# Patient Record
Sex: FEMALE | Race: WHITE | NOT HISPANIC OR LATINO | URBAN - METROPOLITAN AREA
[De-identification: names, ages, dates, MRNs, and addresses within clinical notes are randomized per-mention and may not be internally consistent; named-entity substitution may affect disease eponyms.]

---

## 2017-05-29 ENCOUNTER — EMERGENCY (EMERGENCY)
Facility: HOSPITAL | Age: 82
LOS: 0 days | Discharge: HOME | End: 2017-05-29
Attending: EMERGENCY MEDICINE
Payer: MEDICARE

## 2017-05-29 VITALS
HEART RATE: 59 BPM | WEIGHT: 149.91 LBS | TEMPERATURE: 99 F | DIASTOLIC BLOOD PRESSURE: 69 MMHG | HEIGHT: 63 IN | OXYGEN SATURATION: 95 % | RESPIRATION RATE: 17 BRPM | SYSTOLIC BLOOD PRESSURE: 127 MMHG

## 2017-05-29 LAB
ALBUMIN SERPL ELPH-MCNC: 3 G/DL — LOW (ref 3.3–5)
ALP SERPL-CCNC: 62 U/L — SIGNIFICANT CHANGE UP (ref 40–120)
ALT FLD-CCNC: 13 U/L — SIGNIFICANT CHANGE UP (ref 12–78)
AMYLASE P1 CFR SERPL: 47 U/L — SIGNIFICANT CHANGE UP (ref 25–115)
ANION GAP SERPL CALC-SCNC: 11 MMOL/L — SIGNIFICANT CHANGE UP (ref 5–17)
APPEARANCE UR: CLEAR — SIGNIFICANT CHANGE UP
APTT BLD: 30.1 SEC — SIGNIFICANT CHANGE UP (ref 27.5–37.4)
AST SERPL-CCNC: 17 U/L — SIGNIFICANT CHANGE UP (ref 15–37)
BACTERIA # UR AUTO: ABNORMAL
BASOPHILS # BLD AUTO: 0.1 K/UL — SIGNIFICANT CHANGE UP (ref 0–0.2)
BASOPHILS NFR BLD AUTO: 1.6 % — SIGNIFICANT CHANGE UP (ref 0–2)
BILIRUB SERPL-MCNC: 0.8 MG/DL — SIGNIFICANT CHANGE UP (ref 0.2–1.2)
BILIRUB UR-MCNC: NEGATIVE — SIGNIFICANT CHANGE UP
BUN SERPL-MCNC: 24 MG/DL — HIGH (ref 7–23)
CALCIUM SERPL-MCNC: 8.6 MG/DL — SIGNIFICANT CHANGE UP (ref 8.5–10.1)
CHLORIDE SERPL-SCNC: 104 MMOL/L — SIGNIFICANT CHANGE UP (ref 96–108)
CO2 SERPL-SCNC: 25 MMOL/L — SIGNIFICANT CHANGE UP (ref 22–31)
COLOR SPEC: YELLOW — SIGNIFICANT CHANGE UP
CREAT SERPL-MCNC: 1.02 MG/DL — SIGNIFICANT CHANGE UP (ref 0.5–1.3)
DIFF PNL FLD: ABNORMAL
EOSINOPHIL # BLD AUTO: 0.1 K/UL — SIGNIFICANT CHANGE UP (ref 0–0.5)
EOSINOPHIL NFR BLD AUTO: 1.1 % — SIGNIFICANT CHANGE UP (ref 0–6)
EPI CELLS # UR: SIGNIFICANT CHANGE UP
GLUCOSE SERPL-MCNC: 97 MG/DL — SIGNIFICANT CHANGE UP (ref 70–99)
GLUCOSE UR QL: NEGATIVE MG/DL — SIGNIFICANT CHANGE UP
HCT VFR BLD CALC: 36.6 % — SIGNIFICANT CHANGE UP (ref 34.5–45)
HGB BLD-MCNC: 12.7 G/DL — SIGNIFICANT CHANGE UP (ref 11.5–15.5)
INR BLD: 1.11 RATIO — SIGNIFICANT CHANGE UP (ref 0.88–1.16)
KETONES UR-MCNC: NEGATIVE — SIGNIFICANT CHANGE UP
LACTATE SERPL-SCNC: 0.8 MMOL/L — SIGNIFICANT CHANGE UP (ref 0.7–2)
LEUKOCYTE ESTERASE UR-ACNC: NEGATIVE — SIGNIFICANT CHANGE UP
LIDOCAIN IGE QN: 153 U/L — SIGNIFICANT CHANGE UP (ref 73–393)
LYMPHOCYTES # BLD AUTO: 1.4 K/UL — SIGNIFICANT CHANGE UP (ref 1–3.3)
LYMPHOCYTES # BLD AUTO: 18.5 % — SIGNIFICANT CHANGE UP (ref 13–44)
MCHC RBC-ENTMCNC: 28.9 PG — SIGNIFICANT CHANGE UP (ref 27–34)
MCHC RBC-ENTMCNC: 34.7 GM/DL — SIGNIFICANT CHANGE UP (ref 32–36)
MCV RBC AUTO: 83.2 FL — SIGNIFICANT CHANGE UP (ref 80–100)
MONOCYTES # BLD AUTO: 0.6 K/UL — SIGNIFICANT CHANGE UP (ref 0–0.9)
MONOCYTES NFR BLD AUTO: 7.5 % — SIGNIFICANT CHANGE UP (ref 2–14)
NEUTROPHILS # BLD AUTO: 5.5 K/UL — SIGNIFICANT CHANGE UP (ref 1.8–7.4)
NEUTROPHILS NFR BLD AUTO: 71.2 % — SIGNIFICANT CHANGE UP (ref 43–77)
NITRITE UR-MCNC: NEGATIVE — SIGNIFICANT CHANGE UP
PH UR: 5 — SIGNIFICANT CHANGE UP (ref 5–8)
PLATELET # BLD AUTO: 224 K/UL — SIGNIFICANT CHANGE UP (ref 150–400)
POTASSIUM SERPL-MCNC: 3.9 MMOL/L — SIGNIFICANT CHANGE UP (ref 3.5–5.3)
POTASSIUM SERPL-SCNC: 3.9 MMOL/L — SIGNIFICANT CHANGE UP (ref 3.5–5.3)
PROT SERPL-MCNC: 7.3 GM/DL — SIGNIFICANT CHANGE UP (ref 6–8.3)
PROT UR-MCNC: NEGATIVE MG/DL — SIGNIFICANT CHANGE UP
PROTHROM AB SERPL-ACNC: 12.1 SEC — SIGNIFICANT CHANGE UP (ref 9.8–12.7)
RBC # BLD: 4.4 M/UL — SIGNIFICANT CHANGE UP (ref 3.8–5.2)
RBC # FLD: 12.3 % — SIGNIFICANT CHANGE UP (ref 11–15)
RBC CASTS # UR COMP ASSIST: SIGNIFICANT CHANGE UP /HPF (ref 0–4)
SODIUM SERPL-SCNC: 140 MMOL/L — SIGNIFICANT CHANGE UP (ref 135–145)
SP GR SPEC: 1 — LOW (ref 1.01–1.02)
UROBILINOGEN FLD QL: NEGATIVE MG/DL — SIGNIFICANT CHANGE UP
WBC # BLD: 7.7 K/UL — SIGNIFICANT CHANGE UP (ref 3.8–10.5)
WBC # FLD AUTO: 7.7 K/UL — SIGNIFICANT CHANGE UP (ref 3.8–10.5)
WBC UR QL: SIGNIFICANT CHANGE UP

## 2017-05-29 PROCEDURE — 99284 EMERGENCY DEPT VISIT MOD MDM: CPT

## 2017-05-29 PROCEDURE — 71010: CPT | Mod: 26

## 2017-05-29 RX ORDER — SODIUM CHLORIDE 9 MG/ML
500 INJECTION INTRAMUSCULAR; INTRAVENOUS; SUBCUTANEOUS ONCE
Qty: 0 | Refills: 0 | Status: COMPLETED | OUTPATIENT
Start: 2017-05-29 | End: 2017-05-29

## 2017-05-29 RX ORDER — NITROFURANTOIN MACROCRYSTAL 50 MG
1 CAPSULE ORAL
Qty: 14 | Refills: 0 | OUTPATIENT
Start: 2017-05-29 | End: 2017-06-05

## 2017-05-29 RX ADMIN — SODIUM CHLORIDE 1000 MILLILITER(S): 9 INJECTION INTRAMUSCULAR; INTRAVENOUS; SUBCUTANEOUS at 17:06

## 2017-05-29 NOTE — ED PROVIDER NOTE - MEDICAL DECISION MAKING DETAILS
Patient with uti symptoms; afebrile, patient's daughter will follow up with dr. sosa in am; will start macrobid

## 2017-05-30 DIAGNOSIS — Z88.0 ALLERGY STATUS TO PENICILLIN: ICD-10-CM

## 2017-05-30 DIAGNOSIS — Z79.82 LONG TERM (CURRENT) USE OF ASPIRIN: ICD-10-CM

## 2017-05-30 DIAGNOSIS — N39.0 URINARY TRACT INFECTION, SITE NOT SPECIFIED: ICD-10-CM

## 2017-05-30 DIAGNOSIS — M54.9 DORSALGIA, UNSPECIFIED: ICD-10-CM

## 2017-05-30 DIAGNOSIS — R10.9 UNSPECIFIED ABDOMINAL PAIN: ICD-10-CM

## 2017-05-30 LAB
CULTURE RESULTS: NO GROWTH — SIGNIFICANT CHANGE UP
SPECIMEN SOURCE: SIGNIFICANT CHANGE UP

## 2017-06-03 LAB
CULTURE RESULTS: SIGNIFICANT CHANGE UP
CULTURE RESULTS: SIGNIFICANT CHANGE UP
SPECIMEN SOURCE: SIGNIFICANT CHANGE UP
SPECIMEN SOURCE: SIGNIFICANT CHANGE UP

## 2017-11-09 ENCOUNTER — EMERGENCY (EMERGENCY)
Facility: HOSPITAL | Age: 82
LOS: 0 days | Discharge: HOME | End: 2017-11-09
Attending: EMERGENCY MEDICINE
Payer: MEDICARE

## 2017-11-09 VITALS
TEMPERATURE: 98 F | DIASTOLIC BLOOD PRESSURE: 78 MMHG | OXYGEN SATURATION: 98 % | HEART RATE: 65 BPM | RESPIRATION RATE: 18 BRPM | SYSTOLIC BLOOD PRESSURE: 185 MMHG

## 2017-11-09 VITALS
TEMPERATURE: 98 F | HEIGHT: 59 IN | SYSTOLIC BLOOD PRESSURE: 163 MMHG | DIASTOLIC BLOOD PRESSURE: 83 MMHG | OXYGEN SATURATION: 96 % | WEIGHT: 110.23 LBS | HEART RATE: 66 BPM | RESPIRATION RATE: 19 BRPM

## 2017-11-09 LAB
ALBUMIN SERPL ELPH-MCNC: 2.8 G/DL — LOW (ref 3.3–5)
ALP SERPL-CCNC: 46 U/L — SIGNIFICANT CHANGE UP (ref 40–120)
ALT FLD-CCNC: 12 U/L — SIGNIFICANT CHANGE UP (ref 12–78)
AMMONIA BLD-MCNC: <10 UMOL/L — LOW (ref 11–32)
ANION GAP SERPL CALC-SCNC: 10 MMOL/L — SIGNIFICANT CHANGE UP (ref 5–17)
APPEARANCE UR: ABNORMAL
AST SERPL-CCNC: 12 U/L — LOW (ref 15–37)
BILIRUB SERPL-MCNC: 0.7 MG/DL — SIGNIFICANT CHANGE UP (ref 0.2–1.2)
BILIRUB UR-MCNC: NEGATIVE — SIGNIFICANT CHANGE UP
BUN SERPL-MCNC: 12 MG/DL — SIGNIFICANT CHANGE UP (ref 7–23)
CALCIUM SERPL-MCNC: 8.2 MG/DL — LOW (ref 8.5–10.1)
CHLORIDE SERPL-SCNC: 106 MMOL/L — SIGNIFICANT CHANGE UP (ref 96–108)
CK SERPL-CCNC: 33 U/L — SIGNIFICANT CHANGE UP (ref 26–192)
CO2 SERPL-SCNC: 24 MMOL/L — SIGNIFICANT CHANGE UP (ref 22–31)
COLOR SPEC: YELLOW — SIGNIFICANT CHANGE UP
CREAT SERPL-MCNC: 0.82 MG/DL — SIGNIFICANT CHANGE UP (ref 0.5–1.3)
DIFF PNL FLD: ABNORMAL
GLUCOSE SERPL-MCNC: 82 MG/DL — SIGNIFICANT CHANGE UP (ref 70–99)
GLUCOSE UR QL: NEGATIVE MG/DL — SIGNIFICANT CHANGE UP
HCT VFR BLD CALC: 36.3 % — SIGNIFICANT CHANGE UP (ref 34.5–45)
HGB BLD-MCNC: 11.6 G/DL — SIGNIFICANT CHANGE UP (ref 11.5–15.5)
KETONES UR-MCNC: NEGATIVE — SIGNIFICANT CHANGE UP
LACTATE SERPL-SCNC: 1.5 MMOL/L — SIGNIFICANT CHANGE UP (ref 0.7–2)
LEUKOCYTE ESTERASE UR-ACNC: NEGATIVE — SIGNIFICANT CHANGE UP
LIDOCAIN IGE QN: 276 U/L — SIGNIFICANT CHANGE UP (ref 73–393)
MCHC RBC-ENTMCNC: 26.6 PG — LOW (ref 27–34)
MCHC RBC-ENTMCNC: 32 GM/DL — SIGNIFICANT CHANGE UP (ref 32–36)
MCV RBC AUTO: 83.1 FL — SIGNIFICANT CHANGE UP (ref 80–100)
NITRITE UR-MCNC: NEGATIVE — SIGNIFICANT CHANGE UP
PH UR: 5 — SIGNIFICANT CHANGE UP (ref 5–8)
PLATELET # BLD AUTO: 237 K/UL — SIGNIFICANT CHANGE UP (ref 150–400)
POTASSIUM SERPL-MCNC: 3.5 MMOL/L — SIGNIFICANT CHANGE UP (ref 3.5–5.3)
POTASSIUM SERPL-SCNC: 3.5 MMOL/L — SIGNIFICANT CHANGE UP (ref 3.5–5.3)
PROT SERPL-MCNC: 7.1 GM/DL — SIGNIFICANT CHANGE UP (ref 6–8.3)
PROT UR-MCNC: 15 MG/DL
RBC # BLD: 4.37 M/UL — SIGNIFICANT CHANGE UP (ref 3.8–5.2)
RBC # FLD: 14.6 % — SIGNIFICANT CHANGE UP (ref 11–15)
SODIUM SERPL-SCNC: 140 MMOL/L — SIGNIFICANT CHANGE UP (ref 135–145)
SP GR SPEC: 1.02 — SIGNIFICANT CHANGE UP (ref 1.01–1.02)
TROPONIN I SERPL-MCNC: 0.02 NG/ML — SIGNIFICANT CHANGE UP (ref 0.01–0.04)
UROBILINOGEN FLD QL: NEGATIVE MG/DL — SIGNIFICANT CHANGE UP
WBC # BLD: 5.3 K/UL — SIGNIFICANT CHANGE UP (ref 3.8–10.5)
WBC # FLD AUTO: 5.3 K/UL — SIGNIFICANT CHANGE UP (ref 3.8–10.5)

## 2017-11-09 PROCEDURE — 99284 EMERGENCY DEPT VISIT MOD MDM: CPT

## 2017-11-09 PROCEDURE — 70450 CT HEAD/BRAIN W/O DYE: CPT | Mod: 26

## 2017-11-09 PROCEDURE — 71010: CPT | Mod: 26

## 2017-11-09 NOTE — ED PROVIDER NOTE - OBJECTIVE STATEMENT
84 yo F with worsening dementia and decline in mental status over the last three weeks.  Daughter is at bedside.  She explains mother is impossible to care for now.  She lives with her  who recently had a AAA repair himself, and he can't care for her.  Daughter recently found out just how bad mother's condition has gotten and there's no one to care for her.  Pt. has been confusing family members for other people, locking herself in the bathroom, throwing feces.  PCP was concerned for UTI, and thinks she needs w/u.  No other complaints, currently.  ROS: negative for fever, cough, headache, chest pain, shortness of breath, abd pain, nausea, vomiting, diarrhea, rash, paresthesia, and weakness.   PMH: negative; Meds: Denies; SH: Denies smoking/drinking/drug use 84 yo F with worsening dementia and decline in mental status over the last three weeks.  Daughter is at bedside.  She explains mother is impossible to care for now.  She lives with her  who recently had a AAA repair himself, and he can't care for her.  Daughter recently found out just how bad mother's condition has gotten and there's no one to care for her.  Pt. has been confusing family members for other people, locking herself in the bathroom, throwing feces.  PCP was concerned for UTI, and thinks she needs w/u.  No other complaints, currently.  ROS: negative for fever, cough, headache, chest pain, shortness of breath, abd pain, nausea, vomiting, diarrhea, rash, paresthesia, and weakness.   PMH: vascular dementia, atherosclerosis, progressive memory loss, , arthritis, osteoporosis, hx heart attack s/p stents, htn, hld, hypothyroid; Meds: synthroid, prednisone, bystolic, toviaz, donepizil, simvastatin, losartan, hydroxychloroquine, quetiapine, asa, NAC; SH: Denies smoking/drinking/drug use  PCP: Dr. Sanna Muniz [place], [phone]  neuro: Ti Cummings, [phone]

## 2017-11-09 NOTE — ED PROVIDER NOTE - MEDICAL DECISION MAKING DETAILS
84 yo F with acute change in mental status, r/o infection/stroke, possible acutely worsening dementia  -basic labs, ammonia, cpk, trop, ua, cx, lipase, xr chest, ct brain, ekg  -f/u results; sw involvement for home care

## 2017-11-09 NOTE — ED ADULT TRIAGE NOTE - CHIEF COMPLAINT QUOTE
patient was send here from her Md for evaluation , patient has increase in confusion and agitation in the last 2 weeks , as per daughter patient is here R/O UTI , daughter stated " she did not had a shower for 3 months " daughter stated  the patient is not eating in the last 2 month and she has incontinence

## 2017-11-09 NOTE — ED PROVIDER NOTE - PROGRESS NOTE DETAILS
Results reported to patient--grossly benign  Pt. is at baseline, communicating with staff, no agitated behavior, calm and cooperative, although demented   pt. agrees to f/u with primary care outpt.  pt. understands to return to ED if symptoms worsen; will d/c  SW spoke with family who already has HHA and home services for patient, no indication for admission and placement at this time, if necessary it will be conducted by PCP in conjunction with family

## 2017-11-10 DIAGNOSIS — E03.9 HYPOTHYROIDISM, UNSPECIFIED: ICD-10-CM

## 2017-11-10 DIAGNOSIS — Z95.818 PRESENCE OF OTHER CARDIAC IMPLANTS AND GRAFTS: ICD-10-CM

## 2017-11-10 DIAGNOSIS — Z79.82 LONG TERM (CURRENT) USE OF ASPIRIN: ICD-10-CM

## 2017-11-10 DIAGNOSIS — M19.90 UNSPECIFIED OSTEOARTHRITIS, UNSPECIFIED SITE: ICD-10-CM

## 2017-11-10 DIAGNOSIS — E78.5 HYPERLIPIDEMIA, UNSPECIFIED: ICD-10-CM

## 2017-11-10 DIAGNOSIS — R41.82 ALTERED MENTAL STATUS, UNSPECIFIED: ICD-10-CM

## 2017-11-10 DIAGNOSIS — I10 ESSENTIAL (PRIMARY) HYPERTENSION: ICD-10-CM

## 2017-11-10 DIAGNOSIS — M81.0 AGE-RELATED OSTEOPOROSIS WITHOUT CURRENT PATHOLOGICAL FRACTURE: ICD-10-CM

## 2017-11-10 DIAGNOSIS — Z86.73 PERSONAL HISTORY OF TRANSIENT ISCHEMIC ATTACK (TIA), AND CEREBRAL INFARCTION WITHOUT RESIDUAL DEFICITS: ICD-10-CM

## 2017-11-10 DIAGNOSIS — I70.90 UNSPECIFIED ATHEROSCLEROSIS: ICD-10-CM

## 2017-11-10 DIAGNOSIS — R41.0 DISORIENTATION, UNSPECIFIED: ICD-10-CM

## 2017-11-10 DIAGNOSIS — F03.90 UNSPECIFIED DEMENTIA WITHOUT BEHAVIORAL DISTURBANCE: ICD-10-CM

## 2017-11-10 DIAGNOSIS — Z88.0 ALLERGY STATUS TO PENICILLIN: ICD-10-CM

## 2017-11-10 LAB
CULTURE RESULTS: NO GROWTH — SIGNIFICANT CHANGE UP
SPECIMEN SOURCE: SIGNIFICANT CHANGE UP

## 2017-12-22 ENCOUNTER — INPATIENT (INPATIENT)
Facility: HOSPITAL | Age: 82
LOS: 4 days | Discharge: NURSING, REHAB OR LONG-TERM CARE | End: 2017-12-27
Attending: INTERNAL MEDICINE | Admitting: INTERNAL MEDICINE
Payer: MEDICARE

## 2017-12-22 VITALS
HEIGHT: 64 IN | HEART RATE: 59 BPM | WEIGHT: 134.92 LBS | DIASTOLIC BLOOD PRESSURE: 81 MMHG | OXYGEN SATURATION: 99 % | SYSTOLIC BLOOD PRESSURE: 181 MMHG | RESPIRATION RATE: 20 BRPM | TEMPERATURE: 98 F

## 2017-12-22 DIAGNOSIS — Z29.9 ENCOUNTER FOR PROPHYLACTIC MEASURES, UNSPECIFIED: ICD-10-CM

## 2017-12-22 DIAGNOSIS — R29.6 REPEATED FALLS: ICD-10-CM

## 2017-12-22 DIAGNOSIS — F01.50 VASCULAR DEMENTIA WITHOUT BEHAVIORAL DISTURBANCE: ICD-10-CM

## 2017-12-22 DIAGNOSIS — Z90.710 ACQUIRED ABSENCE OF BOTH CERVIX AND UTERUS: Chronic | ICD-10-CM

## 2017-12-22 DIAGNOSIS — I16.0 HYPERTENSIVE URGENCY: ICD-10-CM

## 2017-12-22 DIAGNOSIS — I10 ESSENTIAL (PRIMARY) HYPERTENSION: ICD-10-CM

## 2017-12-22 DIAGNOSIS — I50.9 HEART FAILURE, UNSPECIFIED: ICD-10-CM

## 2017-12-22 DIAGNOSIS — M19.90 UNSPECIFIED OSTEOARTHRITIS, UNSPECIFIED SITE: ICD-10-CM

## 2017-12-22 DIAGNOSIS — E78.2 MIXED HYPERLIPIDEMIA: ICD-10-CM

## 2017-12-22 LAB
ALBUMIN SERPL ELPH-MCNC: 2.8 G/DL — LOW (ref 3.3–5)
ALP SERPL-CCNC: 51 U/L — SIGNIFICANT CHANGE UP (ref 40–120)
ALT FLD-CCNC: 14 U/L — SIGNIFICANT CHANGE UP (ref 12–78)
ANION GAP SERPL CALC-SCNC: 6 MMOL/L — SIGNIFICANT CHANGE UP (ref 5–17)
APPEARANCE UR: CLEAR — SIGNIFICANT CHANGE UP
AST SERPL-CCNC: 17 U/L — SIGNIFICANT CHANGE UP (ref 15–37)
BASOPHILS # BLD AUTO: 0 K/UL — SIGNIFICANT CHANGE UP (ref 0–0.2)
BASOPHILS NFR BLD AUTO: 0.8 % — SIGNIFICANT CHANGE UP (ref 0–2)
BILIRUB SERPL-MCNC: 0.6 MG/DL — SIGNIFICANT CHANGE UP (ref 0.2–1.2)
BILIRUB UR-MCNC: NEGATIVE — SIGNIFICANT CHANGE UP
BUN SERPL-MCNC: 15 MG/DL — SIGNIFICANT CHANGE UP (ref 7–23)
CALCIUM SERPL-MCNC: 8.5 MG/DL — SIGNIFICANT CHANGE UP (ref 8.5–10.1)
CHLORIDE SERPL-SCNC: 103 MMOL/L — SIGNIFICANT CHANGE UP (ref 96–108)
CK MB BLD-MCNC: 1.2 % — SIGNIFICANT CHANGE UP (ref 0–3.5)
CK MB CFR SERPL CALC: 0.5 NG/ML — SIGNIFICANT CHANGE UP (ref 0.5–3.6)
CK SERPL-CCNC: 41 U/L — SIGNIFICANT CHANGE UP (ref 26–192)
CO2 SERPL-SCNC: 30 MMOL/L — SIGNIFICANT CHANGE UP (ref 22–31)
COLOR SPEC: YELLOW — SIGNIFICANT CHANGE UP
CREAT SERPL-MCNC: 0.69 MG/DL — SIGNIFICANT CHANGE UP (ref 0.5–1.3)
DIFF PNL FLD: ABNORMAL
EOSINOPHIL # BLD AUTO: 0.1 K/UL — SIGNIFICANT CHANGE UP (ref 0–0.5)
EOSINOPHIL NFR BLD AUTO: 1 % — SIGNIFICANT CHANGE UP (ref 0–6)
GLUCOSE SERPL-MCNC: 108 MG/DL — HIGH (ref 70–99)
GLUCOSE UR QL: NEGATIVE MG/DL — SIGNIFICANT CHANGE UP
HCT VFR BLD CALC: 32 % — LOW (ref 34.5–45)
HGB BLD-MCNC: 10.8 G/DL — LOW (ref 11.5–15.5)
KETONES UR-MCNC: NEGATIVE — SIGNIFICANT CHANGE UP
LEUKOCYTE ESTERASE UR-ACNC: NEGATIVE — SIGNIFICANT CHANGE UP
LYMPHOCYTES # BLD AUTO: 0.8 K/UL — LOW (ref 1–3.3)
LYMPHOCYTES # BLD AUTO: 13.6 % — SIGNIFICANT CHANGE UP (ref 13–44)
MCHC RBC-ENTMCNC: 28.4 PG — SIGNIFICANT CHANGE UP (ref 27–34)
MCHC RBC-ENTMCNC: 33.7 GM/DL — SIGNIFICANT CHANGE UP (ref 32–36)
MCV RBC AUTO: 84.4 FL — SIGNIFICANT CHANGE UP (ref 80–100)
MONOCYTES # BLD AUTO: 0.4 K/UL — SIGNIFICANT CHANGE UP (ref 0–0.9)
MONOCYTES NFR BLD AUTO: 6.8 % — SIGNIFICANT CHANGE UP (ref 2–14)
NEUTROPHILS # BLD AUTO: 4.7 K/UL — SIGNIFICANT CHANGE UP (ref 1.8–7.4)
NEUTROPHILS NFR BLD AUTO: 77.8 % — HIGH (ref 43–77)
NITRITE UR-MCNC: NEGATIVE — SIGNIFICANT CHANGE UP
NT-PROBNP SERPL-SCNC: 4463 PG/ML — HIGH (ref 0–450)
PH UR: 6 — SIGNIFICANT CHANGE UP (ref 5–8)
PLATELET # BLD AUTO: 218 K/UL — SIGNIFICANT CHANGE UP (ref 150–400)
POTASSIUM SERPL-MCNC: 4.3 MMOL/L — SIGNIFICANT CHANGE UP (ref 3.5–5.3)
POTASSIUM SERPL-SCNC: 4.3 MMOL/L — SIGNIFICANT CHANGE UP (ref 3.5–5.3)
PROT SERPL-MCNC: 7.2 GM/DL — SIGNIFICANT CHANGE UP (ref 6–8.3)
PROT UR-MCNC: 30 MG/DL
RBC # BLD: 3.79 M/UL — LOW (ref 3.8–5.2)
RBC # FLD: 14.1 % — SIGNIFICANT CHANGE UP (ref 11–15)
SODIUM SERPL-SCNC: 139 MMOL/L — SIGNIFICANT CHANGE UP (ref 135–145)
SP GR SPEC: 1.01 — SIGNIFICANT CHANGE UP (ref 1.01–1.02)
TROPONIN I SERPL-MCNC: 0.02 NG/ML — SIGNIFICANT CHANGE UP (ref 0.01–0.04)
UROBILINOGEN FLD QL: NEGATIVE MG/DL — SIGNIFICANT CHANGE UP
WBC # BLD: 6 K/UL — SIGNIFICANT CHANGE UP (ref 3.8–10.5)
WBC # FLD AUTO: 6 K/UL — SIGNIFICANT CHANGE UP (ref 3.8–10.5)

## 2017-12-22 PROCEDURE — 93010 ELECTROCARDIOGRAM REPORT: CPT

## 2017-12-22 PROCEDURE — 70486 CT MAXILLOFACIAL W/O DYE: CPT | Mod: 26

## 2017-12-22 PROCEDURE — 99223 1ST HOSP IP/OBS HIGH 75: CPT | Mod: AI

## 2017-12-22 PROCEDURE — 71010: CPT | Mod: 26

## 2017-12-22 PROCEDURE — 72125 CT NECK SPINE W/O DYE: CPT | Mod: 26

## 2017-12-22 PROCEDURE — 70450 CT HEAD/BRAIN W/O DYE: CPT | Mod: 26

## 2017-12-22 PROCEDURE — 99285 EMERGENCY DEPT VISIT HI MDM: CPT

## 2017-12-22 PROCEDURE — 76377 3D RENDER W/INTRP POSTPROCES: CPT | Mod: 26,76

## 2017-12-22 RX ORDER — CARVEDILOL PHOSPHATE 80 MG/1
12.5 CAPSULE, EXTENDED RELEASE ORAL EVERY 12 HOURS
Qty: 0 | Refills: 0 | Status: DISCONTINUED | OUTPATIENT
Start: 2017-12-22 | End: 2017-12-26

## 2017-12-22 RX ORDER — LEVOTHYROXINE SODIUM 125 MCG
1 TABLET ORAL
Qty: 0 | Refills: 0 | COMMUNITY

## 2017-12-22 RX ORDER — DONEPEZIL HYDROCHLORIDE 10 MG/1
10 TABLET, FILM COATED ORAL AT BEDTIME
Qty: 0 | Refills: 0 | Status: DISCONTINUED | OUTPATIENT
Start: 2017-12-22 | End: 2017-12-27

## 2017-12-22 RX ORDER — SIMVASTATIN 20 MG/1
1 TABLET, FILM COATED ORAL
Qty: 0 | Refills: 0 | COMMUNITY

## 2017-12-22 RX ORDER — FESOTERODINE FUMARATE 8 MG/1
1 TABLET, FILM COATED, EXTENDED RELEASE ORAL
Qty: 0 | Refills: 0 | COMMUNITY

## 2017-12-22 RX ORDER — ENOXAPARIN SODIUM 100 MG/ML
40 INJECTION SUBCUTANEOUS EVERY 24 HOURS
Qty: 0 | Refills: 0 | Status: DISCONTINUED | OUTPATIENT
Start: 2017-12-22 | End: 2017-12-27

## 2017-12-22 RX ORDER — SIMVASTATIN 20 MG/1
20 TABLET, FILM COATED ORAL AT BEDTIME
Qty: 0 | Refills: 0 | Status: DISCONTINUED | OUTPATIENT
Start: 2017-12-22 | End: 2017-12-27

## 2017-12-22 RX ORDER — AMLODIPINE BESYLATE 2.5 MG/1
10 TABLET ORAL DAILY
Qty: 0 | Refills: 0 | Status: DISCONTINUED | OUTPATIENT
Start: 2017-12-22 | End: 2017-12-26

## 2017-12-22 RX ORDER — LEVOTHYROXINE SODIUM 125 MCG
100 TABLET ORAL DAILY
Qty: 0 | Refills: 0 | Status: DISCONTINUED | OUTPATIENT
Start: 2017-12-22 | End: 2017-12-27

## 2017-12-22 RX ORDER — PREGABALIN 225 MG/1
0 CAPSULE ORAL
Qty: 0 | Refills: 0 | COMMUNITY

## 2017-12-22 RX ORDER — DONEPEZIL HYDROCHLORIDE 10 MG/1
1 TABLET, FILM COATED ORAL
Qty: 0 | Refills: 0 | COMMUNITY

## 2017-12-22 RX ORDER — ASPIRIN/CALCIUM CARB/MAGNESIUM 324 MG
1 TABLET ORAL
Qty: 0 | Refills: 0 | COMMUNITY

## 2017-12-22 RX ORDER — ASPIRIN/CALCIUM CARB/MAGNESIUM 324 MG
81 TABLET ORAL DAILY
Qty: 0 | Refills: 0 | Status: DISCONTINUED | OUTPATIENT
Start: 2017-12-22 | End: 2017-12-27

## 2017-12-22 RX ORDER — FUROSEMIDE 40 MG
20 TABLET ORAL ONCE
Qty: 0 | Refills: 0 | Status: COMPLETED | OUTPATIENT
Start: 2017-12-22 | End: 2017-12-22

## 2017-12-22 RX ORDER — FUROSEMIDE 40 MG
20 TABLET ORAL DAILY
Qty: 0 | Refills: 0 | Status: DISCONTINUED | OUTPATIENT
Start: 2017-12-23 | End: 2017-12-27

## 2017-12-22 RX ORDER — OXYBUTYNIN CHLORIDE 5 MG
5 TABLET ORAL
Qty: 0 | Refills: 0 | Status: DISCONTINUED | OUTPATIENT
Start: 2017-12-22 | End: 2017-12-27

## 2017-12-22 RX ADMIN — SIMVASTATIN 20 MILLIGRAM(S): 20 TABLET, FILM COATED ORAL at 23:11

## 2017-12-22 RX ADMIN — DONEPEZIL HYDROCHLORIDE 10 MILLIGRAM(S): 10 TABLET, FILM COATED ORAL at 23:58

## 2017-12-22 RX ADMIN — CARVEDILOL PHOSPHATE 12.5 MILLIGRAM(S): 80 CAPSULE, EXTENDED RELEASE ORAL at 23:10

## 2017-12-22 RX ADMIN — Medication 20 MILLIGRAM(S): at 20:06

## 2017-12-22 NOTE — PATIENT PROFILE ADULT. - VISION (WITH CORRECTIVE LENSES IF THE PATIENT USUALLY WEARS THEM):
Severely impaired: cannot locate objects without hearing or touching them or patient nonresponsive./blindness right eye

## 2017-12-22 NOTE — H&P ADULT - PMH
Acquired hypothyroidism    Arthritis    Essential hypertension    Mixed hyperlipidemia    Vascular dementia without behavioral disturbance

## 2017-12-22 NOTE — H&P ADULT - NSHPLABSRESULTS_GEN_ALL_CORE
LABS:                        10.8   6.0   )-----------( 218      ( 22 Dec 2017 18:43 )             32.0     12-    139  |  103  |  15  ----------------------------<  108<H>  4.3   |  30  |  0.69    Ca    8.5      22 Dec 2017 18:43    TPro  7.2  /  Alb  2.8<L>  /  TBili  0.6  /  DBili  x   /  AST  17  /  ALT  14  /  AlkPhos  51  12-      Urinalysis Basic - ( 22 Dec 2017 19:12 )    Color: Yellow / Appearance: Clear / S.010 / pH: x  Gluc: x / Ketone: Negative  / Bili: Negative / Urobili: Negative mg/dL   Blood: x / Protein: 30 mg/dL / Nitrite: Negative   Leuk Esterase: Negative / RBC: Occasional /HPF / WBC x   Sq Epi: x / Non Sq Epi: Occasional / Bacteria: Occasional      CAPILLARY BLOOD GLUCOSE          RADIOLOGY & ADDITIONAL TESTS:    Imaging Personally Reviewed:  [ X] YES  [ ] NO

## 2017-12-22 NOTE — ED PROVIDER NOTE - CARE PLAN
Principal Discharge DX:	Vascular dementia Principal Discharge DX:	Hypertensive urgency  Secondary Diagnosis:	Acute congestive heart failure, unspecified congestive heart failure type  Secondary Diagnosis:	Vascular dementia

## 2017-12-22 NOTE — H&P ADULT - HISTORY OF PRESENT ILLNESS
Pt. is a 85 y/o female w/pmhx of  dementia, cad, mi s/p stents,r-eye blindness (failed surgery) arthritis, osteoporosis, htn, hld, hypothyroid present w/increasing LE edema over the last week and generalized weakness.  Per pt she sates she was trying to go to bathroom and doest know if tripped and fell.  per daughter home aid found pt on floor unable to get up.   pt denies any fever, chills, sob, cp, palpitations, n/v/d/c.  Pt states legs has been swelling, per daughter about week to 10 days, no pains, pt gets meals on wheels but kosher and not low salt she states pt this time had fell face forward and has been falling repetitevely over the last few weeks,and is mostly bed bound difficult to ambulate.  She has home aid for 4 hrs and  also w/aaa repair unable to care for her. pt denies any pains.

## 2017-12-22 NOTE — ED ADULT TRIAGE NOTE - CHIEF COMPLAINT QUOTE
Increase confusion, swollen feet with ulcers with incontinence Increase confusion, swollen feet with ulcers with incontinence, daughter arrived later  and said pt fell yesterday and has bruising to head

## 2017-12-22 NOTE — ED ADULT NURSE NOTE - OBJECTIVE STATEMENT
Patients family at bedside stating that patient has a history of dementia that has been worsening. States she has been zoning out and urinating on herself, sitting on the bed for hours, and not like usual self. 4+ pitting edema in the lower extremities.

## 2017-12-22 NOTE — H&P ADULT - NSHPPHYSICALEXAM_GEN_ALL_CORE
Vital Signs Last 24 Hrs  T(C): 36.3 (22 Dec 2017 22:45), Max: 36.4 (22 Dec 2017 17:37)  T(F): 97.4 (22 Dec 2017 22:45), Max: 97.6 (22 Dec 2017 17:37)  HR: 64 (22 Dec 2017 22:45) (55 - 64)  BP: 170/82 (22 Dec 2017 22:45) (170/80 - 181/81)  BP(mean): --  RR: 18 (22 Dec 2017 22:45) (16 - 20)  SpO2: 97% (22 Dec 2017 22:45) (97% - 99%)    PHYSICAL EXAM:    GENERAL: NAD, well-groomed, well-developed, pleasantly demented  HEAD:  Atraumatic, Normocephalic  EYES: R-closed, L-EOMI,  conjunctiva and sclera clear  ENMT: No tonsillar erythema, exudates, or enlargement; Moist mucous membranes, No lesions  NECK: Supple, No JVD, Normal thyroid  NERVOUS SYSTEM:  Alert & Oriented X2,  CHEST/LUNG: Clear to percussion bilaterally; No rales, rhonchi, wheezing, or rubs  HEART: Regular rate and rhythm; No rubs, or gallops, +S1,S2  ABDOMEN: Soft, Nontender, Nondistended; Bowel sounds present  EXTREMITIES:  2+ Peripheral Pulses, No clubbing, cyanosis, or 2+ edema  LYMPH: No cervical adenopathy  RECTAL: deferred  BREAST: No palpatble masses, skin no lesions   : deferred  SKIN: No rashes or lesions    IMPROVE VTE Individual Risk Assessment        RISK                                                          Points  [  ] Previous VTE                                                3  [  ] Thrombophilia                                             2  [  ] Lower limb paralysis                                    2        (unable to hold up >15 seconds)    [  ] Current Cancer                                             2         (within 6 months)  [  x] Immobilization > 24 hrs                              1  [  ] ICU/CCU stay > 24 hours                            1  [x  ] Age > 60                                                    1  IMPROVE VTE Score __2_______

## 2017-12-22 NOTE — ED PROVIDER NOTE - OBJECTIVE STATEMENT
83 y/o female hxvascular dementia, atherosclerosis, progressive memory loss, , arthritis, osteoporosis, hx heart attack s/p stents, htn, hld, hypothyroid present for increasing generalized weakness, progressive loss of function. Pt becoming more incontinent, less able to walk (almost completely wheel chair bound). Daughter states pt fell and hit front of face yesterday. Has home health aid 4 hours per day M-F, gets PT 3 days a week. Lives at home with  who having own health issues and unable to take good care. Here several weeks ago for similar. Daughter would like pt to be admitted for a few days, possible to go to NH. In process of increasing home health aid hours.     unable to ros given dementia.

## 2017-12-22 NOTE — ED PROVIDER NOTE - MEDICAL DECISION MAKING DETAILS
generalized progressive dementia/loss of function, limited home health aid. daughter would like pt to be admitted or some sort of care provided for pt outside of pt's home. no acute changes, will do labs, ct head/face/cspine, cxr, urine. possible metabolic encephalopathy/stroke/bleed, infection vs more likely progressive dementia. if medical work up neg, may need social work in morning.

## 2017-12-22 NOTE — ED ADULT NURSE NOTE - CHIEF COMPLAINT QUOTE
Increase confusion, swollen feet with ulcers with incontinence, daughter arrived later  and said pt fell yesterday and has bruising to head

## 2017-12-22 NOTE — H&P ADULT - ASSESSMENT
83 y/o female w/pmhx of  dementia, cad, mi s/p stents,r-eye blindness (failed surgery) arthritis, osteoporosis, htn, hld, hypothyroid present w/increasing LE edema over the last week and generalized weakness pt w/chf and may need placement

## 2017-12-23 DIAGNOSIS — E46 UNSPECIFIED PROTEIN-CALORIE MALNUTRITION: ICD-10-CM

## 2017-12-23 DIAGNOSIS — E87.6 HYPOKALEMIA: ICD-10-CM

## 2017-12-23 LAB
ANION GAP SERPL CALC-SCNC: 11 MMOL/L — SIGNIFICANT CHANGE UP (ref 5–17)
BUN SERPL-MCNC: 12 MG/DL — SIGNIFICANT CHANGE UP (ref 7–23)
CALCIUM SERPL-MCNC: 8.5 MG/DL — SIGNIFICANT CHANGE UP (ref 8.5–10.1)
CHLORIDE SERPL-SCNC: 101 MMOL/L — SIGNIFICANT CHANGE UP (ref 96–108)
CHOLEST SERPL-MCNC: 141 MG/DL — SIGNIFICANT CHANGE UP (ref 10–199)
CO2 SERPL-SCNC: 28 MMOL/L — SIGNIFICANT CHANGE UP (ref 22–31)
CREAT SERPL-MCNC: 0.6 MG/DL — SIGNIFICANT CHANGE UP (ref 0.5–1.3)
CULTURE RESULTS: NO GROWTH — SIGNIFICANT CHANGE UP
GLUCOSE SERPL-MCNC: 90 MG/DL — SIGNIFICANT CHANGE UP (ref 70–99)
HCT VFR BLD CALC: 33.7 % — LOW (ref 34.5–45)
HDLC SERPL-MCNC: 50 MG/DL — SIGNIFICANT CHANGE UP (ref 40–125)
HGB BLD-MCNC: 10.9 G/DL — LOW (ref 11.5–15.5)
LIPID PNL WITH DIRECT LDL SERPL: 78 MG/DL — SIGNIFICANT CHANGE UP
MCHC RBC-ENTMCNC: 27.2 PG — SIGNIFICANT CHANGE UP (ref 27–34)
MCHC RBC-ENTMCNC: 32.4 GM/DL — SIGNIFICANT CHANGE UP (ref 32–36)
MCV RBC AUTO: 84.1 FL — SIGNIFICANT CHANGE UP (ref 80–100)
PLATELET # BLD AUTO: 222 K/UL — SIGNIFICANT CHANGE UP (ref 150–400)
POTASSIUM SERPL-MCNC: 3.2 MMOL/L — LOW (ref 3.5–5.3)
POTASSIUM SERPL-SCNC: 3.2 MMOL/L — LOW (ref 3.5–5.3)
RBC # BLD: 4 M/UL — SIGNIFICANT CHANGE UP (ref 3.8–5.2)
RBC # FLD: 14.1 % — SIGNIFICANT CHANGE UP (ref 11–15)
SODIUM SERPL-SCNC: 140 MMOL/L — SIGNIFICANT CHANGE UP (ref 135–145)
SPECIMEN SOURCE: SIGNIFICANT CHANGE UP
TOTAL CHOLESTEROL/HDL RATIO MEASUREMENT: 2.8 RATIO — LOW (ref 3.3–7.1)
TRIGL SERPL-MCNC: 65 MG/DL — SIGNIFICANT CHANGE UP (ref 10–149)
TSH SERPL-MCNC: 8 UIU/ML — HIGH (ref 0.36–3.74)
WBC # BLD: 4.9 K/UL — SIGNIFICANT CHANGE UP (ref 3.8–10.5)
WBC # FLD AUTO: 4.9 K/UL — SIGNIFICANT CHANGE UP (ref 3.8–10.5)

## 2017-12-23 PROCEDURE — 99233 SBSQ HOSP IP/OBS HIGH 50: CPT

## 2017-12-23 PROCEDURE — 93306 TTE W/DOPPLER COMPLETE: CPT | Mod: 26

## 2017-12-23 PROCEDURE — 93970 EXTREMITY STUDY: CPT | Mod: 26

## 2017-12-23 RX ORDER — POTASSIUM CHLORIDE 20 MEQ
40 PACKET (EA) ORAL EVERY 4 HOURS
Qty: 0 | Refills: 0 | Status: COMPLETED | OUTPATIENT
Start: 2017-12-23 | End: 2017-12-23

## 2017-12-23 RX ORDER — LOSARTAN POTASSIUM 100 MG/1
100 TABLET, FILM COATED ORAL DAILY
Qty: 0 | Refills: 0 | Status: DISCONTINUED | OUTPATIENT
Start: 2017-12-23 | End: 2017-12-26

## 2017-12-23 RX ORDER — ALPRAZOLAM 0.25 MG
0.25 TABLET ORAL ONCE
Qty: 0 | Refills: 0 | Status: DISCONTINUED | OUTPATIENT
Start: 2017-12-23 | End: 2017-12-23

## 2017-12-23 RX ADMIN — Medication 100 MICROGRAM(S): at 05:09

## 2017-12-23 RX ADMIN — Medication 5 MILLIGRAM(S): at 17:05

## 2017-12-23 RX ADMIN — LOSARTAN POTASSIUM 100 MILLIGRAM(S): 100 TABLET, FILM COATED ORAL at 17:06

## 2017-12-23 RX ADMIN — Medication 20 MILLIGRAM(S): at 05:08

## 2017-12-23 RX ADMIN — CARVEDILOL PHOSPHATE 12.5 MILLIGRAM(S): 80 CAPSULE, EXTENDED RELEASE ORAL at 17:05

## 2017-12-23 RX ADMIN — ENOXAPARIN SODIUM 40 MILLIGRAM(S): 100 INJECTION SUBCUTANEOUS at 05:09

## 2017-12-23 RX ADMIN — Medication 81 MILLIGRAM(S): at 13:07

## 2017-12-23 RX ADMIN — Medication 40 MILLIEQUIVALENT(S): at 10:22

## 2017-12-23 RX ADMIN — Medication 5 MILLIGRAM(S): at 05:08

## 2017-12-23 RX ADMIN — SIMVASTATIN 20 MILLIGRAM(S): 20 TABLET, FILM COATED ORAL at 22:22

## 2017-12-23 RX ADMIN — DONEPEZIL HYDROCHLORIDE 10 MILLIGRAM(S): 10 TABLET, FILM COATED ORAL at 22:22

## 2017-12-23 RX ADMIN — AMLODIPINE BESYLATE 10 MILLIGRAM(S): 2.5 TABLET ORAL at 05:09

## 2017-12-23 RX ADMIN — Medication 0.25 MILLIGRAM(S): at 05:00

## 2017-12-23 RX ADMIN — Medication 40 MILLIEQUIVALENT(S): at 13:07

## 2017-12-23 RX ADMIN — CARVEDILOL PHOSPHATE 12.5 MILLIGRAM(S): 80 CAPSULE, EXTENDED RELEASE ORAL at 05:09

## 2017-12-24 LAB
ANION GAP SERPL CALC-SCNC: 10 MMOL/L — SIGNIFICANT CHANGE UP (ref 5–17)
BUN SERPL-MCNC: 11 MG/DL — SIGNIFICANT CHANGE UP (ref 7–23)
CALCIUM SERPL-MCNC: 8.4 MG/DL — LOW (ref 8.5–10.1)
CHLORIDE SERPL-SCNC: 103 MMOL/L — SIGNIFICANT CHANGE UP (ref 96–108)
CO2 SERPL-SCNC: 27 MMOL/L — SIGNIFICANT CHANGE UP (ref 22–31)
CREAT SERPL-MCNC: 0.72 MG/DL — SIGNIFICANT CHANGE UP (ref 0.5–1.3)
GLUCOSE SERPL-MCNC: 92 MG/DL — SIGNIFICANT CHANGE UP (ref 70–99)
HCT VFR BLD CALC: 34.3 % — LOW (ref 34.5–45)
HGB BLD-MCNC: 11.6 G/DL — SIGNIFICANT CHANGE UP (ref 11.5–15.5)
MCHC RBC-ENTMCNC: 28.3 PG — SIGNIFICANT CHANGE UP (ref 27–34)
MCHC RBC-ENTMCNC: 33.6 GM/DL — SIGNIFICANT CHANGE UP (ref 32–36)
MCV RBC AUTO: 84.2 FL — SIGNIFICANT CHANGE UP (ref 80–100)
PLATELET # BLD AUTO: 238 K/UL — SIGNIFICANT CHANGE UP (ref 150–400)
POTASSIUM SERPL-MCNC: 3.8 MMOL/L — SIGNIFICANT CHANGE UP (ref 3.5–5.3)
POTASSIUM SERPL-SCNC: 3.8 MMOL/L — SIGNIFICANT CHANGE UP (ref 3.5–5.3)
RBC # BLD: 4.08 M/UL — SIGNIFICANT CHANGE UP (ref 3.8–5.2)
RBC # FLD: 13.8 % — SIGNIFICANT CHANGE UP (ref 11–15)
SODIUM SERPL-SCNC: 140 MMOL/L — SIGNIFICANT CHANGE UP (ref 135–145)
WBC # BLD: 5.1 K/UL — SIGNIFICANT CHANGE UP (ref 3.8–10.5)
WBC # FLD AUTO: 5.1 K/UL — SIGNIFICANT CHANGE UP (ref 3.8–10.5)

## 2017-12-24 PROCEDURE — 99233 SBSQ HOSP IP/OBS HIGH 50: CPT

## 2017-12-24 RX ADMIN — Medication 100 MICROGRAM(S): at 05:48

## 2017-12-24 RX ADMIN — CARVEDILOL PHOSPHATE 12.5 MILLIGRAM(S): 80 CAPSULE, EXTENDED RELEASE ORAL at 17:07

## 2017-12-24 RX ADMIN — AMLODIPINE BESYLATE 10 MILLIGRAM(S): 2.5 TABLET ORAL at 05:48

## 2017-12-24 RX ADMIN — Medication 5 MILLIGRAM(S): at 17:07

## 2017-12-24 RX ADMIN — Medication 20 MILLIGRAM(S): at 05:49

## 2017-12-24 RX ADMIN — LOSARTAN POTASSIUM 100 MILLIGRAM(S): 100 TABLET, FILM COATED ORAL at 05:48

## 2017-12-24 RX ADMIN — CARVEDILOL PHOSPHATE 12.5 MILLIGRAM(S): 80 CAPSULE, EXTENDED RELEASE ORAL at 05:48

## 2017-12-24 RX ADMIN — Medication 81 MILLIGRAM(S): at 13:13

## 2017-12-24 RX ADMIN — Medication 5 MILLIGRAM(S): at 05:48

## 2017-12-24 RX ADMIN — ENOXAPARIN SODIUM 40 MILLIGRAM(S): 100 INJECTION SUBCUTANEOUS at 05:49

## 2017-12-24 RX ADMIN — DONEPEZIL HYDROCHLORIDE 10 MILLIGRAM(S): 10 TABLET, FILM COATED ORAL at 21:35

## 2017-12-24 RX ADMIN — SIMVASTATIN 20 MILLIGRAM(S): 20 TABLET, FILM COATED ORAL at 21:35

## 2017-12-24 NOTE — DIETITIAN INITIAL EVALUATION ADULT. - PHYSICAL APPEARANCE
underweight/BMI 19.4; LLE edema; Nutrition focused physical exam conducted ; found signs of malnutrition [ ]absent [x ]present.  Subcutaneous fat loss: [moderate] Orbital fat pads region, [none ]Buccal fat region, [severe ]Triceps region,  [moderate ]Ribs region.  Muscle wasting: [moderate ]Temples region, [severe ]Clavicle region, [severe ]Shoulder region, [severe ]Scapula region, [severe ]Interosseous region,  [none ]thigh region, [edema ]Calf region

## 2017-12-24 NOTE — PHYSICAL THERAPY INITIAL EVALUATION ADULT - IMPAIRMENTS FOUND, PT EVAL
gait, locomotion, and balance/neuromotor development and sensory integration/muscle strength/poor safety awareness/posture/aerobic capacity/endurance/fine motor/gross motor/cognitive impairment/ergonomics and body mechanics

## 2017-12-24 NOTE — DIETITIAN INITIAL EVALUATION ADULT. - PERTINENT LABORATORY DATA
12-24 Na140 mmol/L Glu 92 mg/dL K+ 3.8 mmol/L Cr  0.72 mg/dL BUN 11 mg/dL Phos n/a   Alb n/a   PAB n/a

## 2017-12-24 NOTE — DIETITIAN INITIAL EVALUATION ADULT. - OTHER INFO
pt seen due to MD consult for protein calorie malnutrition. pt c CHF dx as well. pt breakfast tray observed; <50% po intake. pt lives c  and has HHA 4 hrs/day. family complains she only takes a few bites and even if she says she wants something to eat, as soon as she gets it she doesn't eat it. they feed her smaller more frequent meals as opposed to fewer big ones so that she eats more. past wt obtained from previous chart

## 2017-12-24 NOTE — DIETITIAN INITIAL EVALUATION ADULT. - ENERGY NEEDS
Height (cm): 162.56 (12-22)  Weight (kg): 51.3 (12-24)  BMI (kg/m2): 19.4 (12-24)  Ideal Body Weight: 54.5 kg +/-10%  % Ideal Body Weight: 100%

## 2017-12-24 NOTE — PHYSICAL THERAPY INITIAL EVALUATION ADULT - PLANNED THERAPY INTERVENTIONS, PT EVAL
transfer training/motor coordination training/neuromuscular re-education/stretching/balance training/bed mobility training/gait training/manual therapy techniques/postural re-education/strengthening

## 2017-12-24 NOTE — PHYSICAL THERAPY INITIAL EVALUATION ADULT - MODIFIED CLINICAL TEST OF SENSORY INTEGRATION IN BALANCE TEST
Bowels Score _5__, Bladder Score _5__, Grooming Score 0___, Toilet Use Score _5__, Feeding Score _5__, Transfer Score __0_, Mobility Score __0_, Dressing Score _5__, Stairs Score 0___, Bathing Score0 ___,     Total Score _25__

## 2017-12-24 NOTE — DIETITIAN INITIAL EVALUATION ADULT. - ORAL INTAKE PTA
pt doesn't eat much according to family. she drinks Ensure at home sometimes. she likes fruit. but eats less than half her meals./fair

## 2017-12-24 NOTE — CHART NOTE - NSCHARTNOTEFT_GEN_A_CORE
Upon Nutritional Assessment by the Registered Dietitian your patient was determined to meet criteria / has evidence of the following diagnosis/diagnoses:          [ ]  Mild Protein Calorie Malnutrition        [ ]  Moderate Protein Calorie Malnutrition        [x ] Severe Protein Calorie Malnutrition        [ ] Unspecified Protein Calorie Malnutrition        [ ] Underweight / BMI <19        [ ] Morbid Obesity / BMI > 40      Findings as based on:  •  Comprehensive nutrition assessment and consultation  •  Calorie counts (nutrient intake analysis)  •  Food acceptance and intake status from observations by staff  •  Follow up  •  Patient education  •  Intervention secondary to interdisciplinary rounds  •   concerns      Treatment:    The following diet has been recommended:  DASH/TLC; Kosher; Ensure Enlivex2 (provides 700 kcals, 40g pro)     PROVIDER Section:     By signing this assessment you are acknowledging and agree with the diagnosis/diagnoses assigned by the Registered Dietitian    Comments:

## 2017-12-25 ENCOUNTER — TRANSCRIPTION ENCOUNTER (OUTPATIENT)
Age: 82
End: 2017-12-25

## 2017-12-25 PROCEDURE — 99239 HOSP IP/OBS DSCHRG MGMT >30: CPT

## 2017-12-25 RX ORDER — PREDNISOLONE 5 MG
0 TABLET ORAL
Qty: 0 | Refills: 0 | COMMUNITY

## 2017-12-25 RX ORDER — CARVEDILOL PHOSPHATE 80 MG/1
1 CAPSULE, EXTENDED RELEASE ORAL
Qty: 0 | Refills: 0 | COMMUNITY
Start: 2017-12-25

## 2017-12-25 RX ORDER — OXYBUTYNIN CHLORIDE 5 MG
1 TABLET ORAL
Qty: 0 | Refills: 0 | COMMUNITY
Start: 2017-12-25

## 2017-12-25 RX ORDER — ACETAMINOPHEN 500 MG
650 TABLET ORAL EVERY 6 HOURS
Qty: 0 | Refills: 0 | Status: DISCONTINUED | OUTPATIENT
Start: 2017-12-25 | End: 2017-12-27

## 2017-12-25 RX ORDER — LOSARTAN POTASSIUM 100 MG/1
1 TABLET, FILM COATED ORAL
Qty: 0 | Refills: 0 | COMMUNITY
Start: 2017-12-25

## 2017-12-25 RX ORDER — ACETAMINOPHEN 500 MG
2 TABLET ORAL
Qty: 0 | Refills: 0 | COMMUNITY
Start: 2017-12-25

## 2017-12-25 RX ADMIN — AMLODIPINE BESYLATE 10 MILLIGRAM(S): 2.5 TABLET ORAL at 05:23

## 2017-12-25 RX ADMIN — LOSARTAN POTASSIUM 100 MILLIGRAM(S): 100 TABLET, FILM COATED ORAL at 05:23

## 2017-12-25 RX ADMIN — Medication 20 MILLIGRAM(S): at 05:23

## 2017-12-25 RX ADMIN — Medication 81 MILLIGRAM(S): at 11:26

## 2017-12-25 RX ADMIN — ENOXAPARIN SODIUM 40 MILLIGRAM(S): 100 INJECTION SUBCUTANEOUS at 05:23

## 2017-12-25 RX ADMIN — CARVEDILOL PHOSPHATE 12.5 MILLIGRAM(S): 80 CAPSULE, EXTENDED RELEASE ORAL at 17:03

## 2017-12-25 RX ADMIN — CARVEDILOL PHOSPHATE 12.5 MILLIGRAM(S): 80 CAPSULE, EXTENDED RELEASE ORAL at 05:23

## 2017-12-25 RX ADMIN — Medication 650 MILLIGRAM(S): at 11:26

## 2017-12-25 RX ADMIN — DONEPEZIL HYDROCHLORIDE 10 MILLIGRAM(S): 10 TABLET, FILM COATED ORAL at 21:10

## 2017-12-25 RX ADMIN — Medication 5 MILLIGRAM(S): at 05:23

## 2017-12-25 RX ADMIN — Medication 650 MILLIGRAM(S): at 13:05

## 2017-12-25 RX ADMIN — Medication 5 MILLIGRAM(S): at 17:03

## 2017-12-25 RX ADMIN — SIMVASTATIN 20 MILLIGRAM(S): 20 TABLET, FILM COATED ORAL at 21:10

## 2017-12-25 RX ADMIN — Medication 100 MICROGRAM(S): at 05:23

## 2017-12-25 NOTE — DISCHARGE NOTE ADULT - HOSPITAL COURSE
83 y/o female w/pmhx of  dementia, cad, mi s/p stents,r-eye blindness (failed surgery) arthritis, osteoporosis, htn, hld, hypothyroid present w/increasing LE edema over the last week and generalized weakness pt w/chf and may need placement     Problem/Plan - 1:  ·  Problem: Acute congestive heart failure, unspecified congestive heart failure type.  Plan: Diastolic chf   tele  tte reviewed  D/c to Rehab     Problem/Plan - 2:  ·  Problem: Hypertensive urgency.  Plan: cw current meds, bp improved.      Problem/Plan - 3:  ·  Problem: Vascular dementia without behavioral disturbance.  Plan: supportive care.      Problem/Plan - 4:  ·  Problem: Mixed hyperlipidemia.  Plan: cont zocor.      Problem/Plan - 5:  ·  Problem: Arthritis.  Plan: tylenol prn.      Problem/Plan - 6:  Problem: Essential hypertension. Plan: resume out pt meds.     Problem/Plan - 7:  ·  Problem: severe Protein calorie malnutrition.  Plan: nutrition eval.      Problem/Plan - 8:  ·  Problem: Hypokalemia.  Plan: supplement, monitor bmp.      Problem/Plan - 9:  ·  Problem: Frequent falls.  Plan: Pt eval  social work consult.      Problem/Plan - 10:  Problem: Preventive measure. Plan; sq heparin.    Attending Attestation:   I was physically present for the key portions of the evaluation and management (E/M) service provided.  I agree with the above history, physical, and plan which I have reviewed and edited where appropriate.     45 minutes spent on total d/c encounter; more than 50% of the visit was spent counseling and/or coordinating care by the attending physician. 85 y/o female w/pmhx of  dementia, cad, mi s/p stents,r-eye blindness (failed surgery) arthritis, osteoporosis, htn, hld, hypothyroid present w/increasing LE edema over the last week and generalized weakness.   Hypertensive urgency.  Plan: cw current meds, bp improved. hold off all bp meds as bp low normal now. follow up with pmd and monitor vitals and resume meds as needed      Problem/Plan - 3:  ·  Problem: Vascular dementia without behavioral disturbance.  Plan: supportive care.      Problem/Plan - 4:  ·  Problem: Mixed hyperlipidemia.  Plan: cont zocor.      Problem/Plan - 5:  ·  Problem: Arthritis.  Plan: tylenol prn.      Problem/Plan - 6:  Problem: Essential hypertension. Plan: resume out pt meds.     Problem/Plan - 7:  ·  Problem: severe Protein calorie malnutrition.  Plan: nutrition eval.      Problem/Plan - 8:  ·  Problem: Hypokalemia.  Plan: supplement, monitor bmp.

## 2017-12-25 NOTE — DISCHARGE NOTE ADULT - PLAN OF CARE
f/u pcp f/u pcp, activity as tolerated, heart healthy diet not low normal follow up pcp, activity as tolerated, heart healthy diet. hold off all meds and monitor vitals and resume meds as needed pain mgmt supportive care cont on home meds DASH/TLC; Kosher; Ensure Enlivex2 (provides 700 kcals, 40g pro)

## 2017-12-25 NOTE — DISCHARGE NOTE ADULT - MEDICATION SUMMARY - MEDICATIONS TO TAKE
I will START or STAY ON the medications listed below when I get home from the hospital:    aspirin 81 mg oral tablet  -- 1 tab(s) by mouth once a day  -- Indication: For Preventive measure    acetaminophen 325 mg oral tablet  -- 2 tab(s) by mouth every 6 hours, As needed, Moderate Pain (4 - 6)  -- Indication: For Preventive measure    losartan 100 mg oral tablet  -- 1 tab(s) by mouth once a day  -- Indication: For Essential hypertension    simvastatin 20 mg oral tablet  -- 1 tab(s) by mouth once a day (at bedtime)  -- Indication: For Hld    Bystolic 10 mg oral tablet  -- 1 tab(s) by mouth once a day  -- Indication: For Essential hypertension    carvedilol 12.5 mg oral tablet  -- 1 tab(s) by mouth every 12 hours  -- Indication: For Essential hypertension    amLODIPine 10 mg oral tablet  -- 1 tab(s) by mouth once a day  -- Indication: For Essential hypertension    donepezil 10 mg oral tablet  -- 1 tab(s) by mouth once a day (at bedtime)  -- Indication: For Vascular dementia without behavioral disturbance    Synthroid 100 mcg (0.1 mg) oral tablet  -- 1 tab(s) by mouth once a day  -- Indication: For Acquired hypothyroidism    oxybutynin 5 mg oral tablet  -- 1 tab(s) by mouth 2 times a day  -- Indication: For overactive bladder    Toviaz 8 mg oral tablet, extended release  -- 1 tab(s) by mouth once a day  -- Indication: For overactive bladder    Vitamin B12  --  by mouth   -- Indication: For Vitamin I will START or STAY ON the medications listed below when I get home from the hospital:    aspirin 81 mg oral tablet  -- 1 tab(s) by mouth once a day  -- Indication: For Preventive measure    acetaminophen 325 mg oral tablet  -- 2 tab(s) by mouth every 6 hours, As needed, Moderate Pain (4 - 6)  -- Indication: For Preventive measure    simvastatin 20 mg oral tablet  -- 1 tab(s) by mouth once a day (at bedtime)  -- Indication: For Hld    donepezil 10 mg oral tablet  -- 1 tab(s) by mouth once a day (at bedtime)  -- Indication: For Vascular dementia without behavioral disturbance    Synthroid 100 mcg (0.1 mg) oral tablet  -- 1 tab(s) by mouth once a day  -- Indication: For Acquired hypothyroidism    Toviaz 8 mg oral tablet, extended release  -- 1 tab(s) by mouth once a day  -- Indication: For overactive bladder    Vitamin B12  --  by mouth   -- Indication: For Vitamin

## 2017-12-25 NOTE — DISCHARGE NOTE ADULT - MEDICATION SUMMARY - MEDICATIONS TO STOP TAKING
I will STOP taking the medications listed below when I get home from the hospital:  None I will STOP taking the medications listed below when I get home from the hospital:    Bystolic 10 mg oral tablet  -- 1 tab(s) by mouth once a day    amLODIPine 10 mg oral tablet  -- 1 tab(s) by mouth once a day    prednisoLONE  --  by mouth

## 2017-12-25 NOTE — DISCHARGE NOTE ADULT - SECONDARY DIAGNOSIS.
Arthritis Essential hypertension Hypertensive urgency Protein calorie malnutrition Vascular dementia without behavioral disturbance Acquired hypothyroidism Mixed hyperlipidemia Severe protein-calorie malnutrition

## 2017-12-25 NOTE — DISCHARGE NOTE ADULT - PATIENT PORTAL LINK FT
“You can access the [facility] Patient Portal, offered by [facility], by registering with the following website: [email or web address]”

## 2017-12-25 NOTE — DISCHARGE NOTE ADULT - CARE PLAN
Principal Discharge DX:	Acute congestive heart failure, unspecified congestive heart failure type  Goal:	f/u pcp  Instructions for follow-up, activity and diet:	f/u pcp, activity as tolerated, heart healthy diet  Secondary Diagnosis:	Arthritis  Secondary Diagnosis:	Essential hypertension  Secondary Diagnosis:	Hypertensive urgency  Secondary Diagnosis:	Protein calorie malnutrition  Secondary Diagnosis:	Vascular dementia without behavioral disturbance Principal Discharge DX:	Hypertensive urgency  Goal:	not low normal  Instructions for follow-up, activity and diet:	follow up pcp, activity as tolerated, heart healthy diet. hold off all meds and monitor vitals and resume meds as needed  Secondary Diagnosis:	Arthritis  Instructions for follow-up, activity and diet:	pain mgmt  Secondary Diagnosis:	Vascular dementia without behavioral disturbance  Instructions for follow-up, activity and diet:	supportive care  Secondary Diagnosis:	Acquired hypothyroidism  Instructions for follow-up, activity and diet:	cont on home meds  Secondary Diagnosis:	Mixed hyperlipidemia  Instructions for follow-up, activity and diet:	cont on home meds  Secondary Diagnosis:	Severe protein-calorie malnutrition  Instructions for follow-up, activity and diet:	DASH/TLC; Kosher; Ensure Enlivex2 (provides 700 kcals, 40g pro)

## 2017-12-26 DIAGNOSIS — E43 UNSPECIFIED SEVERE PROTEIN-CALORIE MALNUTRITION: ICD-10-CM

## 2017-12-26 PROCEDURE — 99233 SBSQ HOSP IP/OBS HIGH 50: CPT

## 2017-12-26 RX ORDER — NEBIVOLOL HYDROCHLORIDE 5 MG/1
1 TABLET ORAL
Qty: 0 | Refills: 0 | COMMUNITY

## 2017-12-26 RX ORDER — AMLODIPINE BESYLATE 2.5 MG/1
1 TABLET ORAL
Qty: 0 | Refills: 0 | COMMUNITY

## 2017-12-26 RX ADMIN — Medication 100 MICROGRAM(S): at 05:53

## 2017-12-26 RX ADMIN — Medication 81 MILLIGRAM(S): at 11:36

## 2017-12-26 RX ADMIN — CARVEDILOL PHOSPHATE 12.5 MILLIGRAM(S): 80 CAPSULE, EXTENDED RELEASE ORAL at 05:53

## 2017-12-26 RX ADMIN — SIMVASTATIN 20 MILLIGRAM(S): 20 TABLET, FILM COATED ORAL at 21:25

## 2017-12-26 RX ADMIN — DONEPEZIL HYDROCHLORIDE 10 MILLIGRAM(S): 10 TABLET, FILM COATED ORAL at 21:25

## 2017-12-26 RX ADMIN — ENOXAPARIN SODIUM 40 MILLIGRAM(S): 100 INJECTION SUBCUTANEOUS at 05:53

## 2017-12-26 RX ADMIN — Medication 5 MILLIGRAM(S): at 05:53

## 2017-12-26 RX ADMIN — Medication 5 MILLIGRAM(S): at 17:12

## 2017-12-26 NOTE — PROGRESS NOTE ADULT - PROBLEM SELECTOR PROBLEM 3
Vascular dementia without behavioral disturbance
Mixed hyperlipidemia
Vascular dementia without behavioral disturbance

## 2017-12-26 NOTE — PROGRESS NOTE ADULT - ASSESSMENT
83 y/o female w/pmhx of  dementia, cad, mi s/p stents,r-eye blindness (failed surgery) arthritis, osteoporosis, htn, hld, hypothyroid present w/increasing LE edema over the last week and generalized weakness pt w/chf and may need placement
83 y/o female w/pmhx of  dementia, cad, mi s/p stents,r-eye blindness (failed surgery) arthritis, osteoporosis, htn, hld, hypothyroid present w/increasing LE edema over the last week and generalized weakness pt w/chf and may need placement
85 y/o female w/pmhx of  dementia, cad, mi s/p stents,r-eye blindness (failed surgery) arthritis, osteoporosis, htn, hld, hypothyroid present w/increasing LE edema over the last week and generalized weakness. Hypertensive urgency.  Plan: cw current meds, bp improved. hold off all bp meds as bp low normal now. follow up with pmd and monitor vitals and resume meds as needed

## 2017-12-26 NOTE — PROGRESS NOTE ADULT - SUBJECTIVE AND OBJECTIVE BOX
Patient is a 84y old  Female who presents with a chief complaint of fall (22 Dec 2017 22:48)      INTERVAL HPI/OVERNIGHT EVENTS: no events overnight     MEDICATIONS  (STANDING):  amLODIPine   Tablet 10 milliGRAM(s) Oral daily  aspirin enteric coated 81 milliGRAM(s) Oral daily  carvedilol 12.5 milliGRAM(s) Oral every 12 hours  donepezil 10 milliGRAM(s) Oral at bedtime  enoxaparin Injectable 40 milliGRAM(s) SubCutaneous every 24 hours  furosemide   Injectable 20 milliGRAM(s) IV Push daily  levothyroxine 100 MICROGram(s) Oral daily  oxybutynin 5 milliGRAM(s) Oral two times a day  potassium chloride    Tablet ER 40 milliEquivalent(s) Oral every 4 hours  simvastatin 20 milliGRAM(s) Oral at bedtime    MEDICATIONS  (PRN):      Allergies    penicillin (Unknown)    Intolerances          Vital Signs Last 24 Hrs  T(C): 36.6 (23 Dec 2017 06:00), Max: 36.6 (23 Dec 2017 06:00)  T(F): 97.8 (23 Dec 2017 06:00), Max: 97.8 (23 Dec 2017 06:00)  HR: 57 (23 Dec 2017 06:00) (55 - 66)  BP: 152/76 (23 Dec 2017 06:00) (152/76 - 181/81)  BP(mean): --  RR: 19 (23 Dec 2017 06:00) (16 - 20)  SpO2: 98% (23 Dec 2017 06:00) (97% - 99%)    PHYSICAL EXAM:  GENERAL: elderly frail female  HEAD:  Atraumatic, Normocephalic  EYES: R-closed, L-EOMI,  conjunctiva and sclera clear  ENMT: No tonsillar erythema, exudates, or enlargement; Moist mucous membranes, no lesions  NECK: Supple, No JVD, Normal thyroid  NERVOUS SYSTEM:  Alert & Oriented X2,  CHEST/LUNG: Clear to percussion bilaterally; No rales, rhonchi, wheezing, or rubs  HEART: Regular rate and rhythm; No rubs, or gallops, +S1,S2  ABDOMEN: Soft, Nontender, Nondistended; Bowel sounds present  EXTREMITIES:  2+ Peripheral Pulses, No clubbing, cyanosis, or 2+ edema  LYMPH: No cervical adenopathy    LABS:                        10.9   4.9   )-----------( 222      ( 23 Dec 2017 07:46 )             33.7     12-    140  |  101  |  12  ----------------------------<  90  3.2<L>   |  28  |  0.60    Ca    8.5      23 Dec 2017 07:46    TPro  7.2  /  Alb  2.8<L>  /  TBili  0.6  /  DBili  x   /  AST  17  /  ALT  14  /  AlkPhos  51        Urinalysis Basic - ( 22 Dec 2017 19:12 )    Color: Yellow / Appearance: Clear / S.010 / pH: x  Gluc: x / Ketone: Negative  / Bili: Negative / Urobili: Negative mg/dL   Blood: x / Protein: 30 mg/dL / Nitrite: Negative   Leuk Esterase: Negative / RBC: Occasional /HPF / WBC x   Sq Epi: x / Non Sq Epi: Occasional / Bacteria: Occasional      CAPILLARY BLOOD GLUCOSE          RADIOLOGY & ADDITIONAL TESTS:    Imaging Personally Reviewed:  [ ] YES  [ ] NO    Consultant(s) Notes Reviewed:  [ ] YES  [ ] NO    Care Discussed with Consultants/Other Providers [ ] YES  [ ] NO
Patient is a 84y old  Female who presents with a chief complaint of fall (22 Dec 2017 22:48)      INTERVAL HPI/OVERNIGHT EVENTS: no events overnight     MEDICATIONS  (STANDING):  amLODIPine   Tablet 10 milliGRAM(s) Oral daily  aspirin enteric coated 81 milliGRAM(s) Oral daily  carvedilol 12.5 milliGRAM(s) Oral every 12 hours  donepezil 10 milliGRAM(s) Oral at bedtime  enoxaparin Injectable 40 milliGRAM(s) SubCutaneous every 24 hours  furosemide   Injectable 20 milliGRAM(s) IV Push daily  levothyroxine 100 MICROGram(s) Oral daily  losartan 100 milliGRAM(s) Oral daily  oxybutynin 5 milliGRAM(s) Oral two times a day  simvastatin 20 milliGRAM(s) Oral at bedtime    MEDICATIONS  (PRN):      Allergies    penicillin (Unknown)    Intolerances      Vital Signs Last 24 Hrs  T(C): 36.4 (24 Dec 2017 05:02), Max: 36.5 (23 Dec 2017 16:57)  T(F): 97.5 (24 Dec 2017 05:02), Max: 97.7 (23 Dec 2017 16:57)  HR: 63 (24 Dec 2017 09:00) (57 - 80)  BP: 115/86 (24 Dec 2017 09:00) (115/86 - 157/84)  BP(mean): --  RR: 17 (24 Dec 2017 09:00) (17 - 18)  SpO2: 98% (24 Dec 2017 09:00) (95% - 98%)    PHYSICAL EXAM:  GENERAL: elderly frail female  HEAD:  Atraumatic, Normocephalic  EYES: R-closed, L-EOMI,  conjunctiva and sclera clear  ENMT: No tonsillar erythema, exudates, or enlargement; Moist mucous membranes, no lesions  NECK: Supple, No JVD, Normal thyroid  NERVOUS SYSTEM:  Alert & Oriented X2,  CHEST/LUNG: Clear to percussion bilaterally; No rales, rhonchi, wheezing, or rubs  HEART: Regular rate and rhythm; No rubs, or gallops, +S1,S2  ABDOMEN: Soft, Nontender, Nondistended; Bowel sounds present  EXTREMITIES:  2+ Peripheral Pulses, No clubbing, cyanosis, or 2+ edema  LYMPH: No cervical adenopathy  LABS:                        11.6   5.1   )-----------( 238      ( 24 Dec 2017 07:04 )             34.3     12-24    140  |  103  |  11  ----------------------------<  92  3.8   |  27  |  0.72    Ca    8.4<L>      24 Dec 2017 07:04    TPro  7.2  /  Alb  2.8<L>  /  TBili  0.6  /  DBili  x   /  AST  17  /  ALT  14  /  AlkPhos  51        Urinalysis Basic - ( 22 Dec 2017 19:12 )    Color: Yellow / Appearance: Clear / S.010 / pH: x  Gluc: x / Ketone: Negative  / Bili: Negative / Urobili: Negative mg/dL   Blood: x / Protein: 30 mg/dL / Nitrite: Negative   Leuk Esterase: Negative / RBC: Occasional /HPF / WBC x   Sq Epi: x / Non Sq Epi: Occasional / Bacteria: Occasional      CAPILLARY BLOOD GLUCOSE          RADIOLOGY & ADDITIONAL TESTS:    Imaging Personally Reviewed:  [ ] YES  [ ] NO    Consultant(s) Notes Reviewed:  [ ] YES  [ ] NO    Care Discussed with Consultants/Other Providers [ ] YES  [ ] NO
Patient is a 84y old  Female who presents with a chief complaint of fall (25 Dec 2017 10:33)      OVERNIGHT EVENTS: BP is low normal    REVIEW OF SYSTEMS: denies chest pain/SOB, diaphoresis, no F/C, cough, dizziness, headache, blurry vision, nausea, vomiting, abdominal pain. All others review of systems negative     MEDICATIONS  (STANDING):  aspirin enteric coated 81 milliGRAM(s) Oral daily  carvedilol 12.5 milliGRAM(s) Oral every 12 hours  donepezil 10 milliGRAM(s) Oral at bedtime  enoxaparin Injectable 40 milliGRAM(s) SubCutaneous every 24 hours  furosemide   Injectable 20 milliGRAM(s) IV Push daily  levothyroxine 100 MICROGram(s) Oral daily  losartan 100 milliGRAM(s) Oral daily  oxybutynin 5 milliGRAM(s) Oral two times a day  simvastatin 20 milliGRAM(s) Oral at bedtime    MEDICATIONS  (PRN):  acetaminophen   Tablet. 650 milliGRAM(s) Oral every 6 hours PRN Moderate Pain (4 - 6)      Allergies    penicillin (Unknown)    Intolerances        T(F): 96.7 (12-26-17 @ 11:11), Max: 98 (12-26-17 @ 05:48)  HR: 69 (12-26-17 @ 11:11) (64 - 69)  BP: 98/57 (12-26-17 @ 11:11) (98/57 - 148/75)  RR: 17 (12-26-17 @ 11:11) (16 - 19)  SpO2: 100% (12-26-17 @ 11:11) (97% - 100%)  Wt(kg): --    PHYSICAL EXAM:  GENERAL: elderly frail female  HEAD:  Atraumatic, Normocephalic  EYES: R-closed, L-EOMI,  conjunctiva and sclera clear  ENMT: No tonsillar erythema, exudates, or enlargement; Moist mucous membranes, no lesions  NECK: Supple, No JVD, Normal thyroid  NERVOUS SYSTEM:  Alert & Oriented X2,  CHEST/LUNG: Clear to percussion bilaterally; No rales, rhonchi, wheezing, or rubs  HEART: Regular rate and rhythm; No rubs, or gallops, +S1,S2  ABDOMEN: Soft, Nontender, Nondistended; Bowel sounds present  EXTREMITIES:  2+ Peripheral Pulses, No clubbing, cyanosis, or 2+ edema  LYMPH: No cervical adenopathy  SKIN: No rashes or lesions    LABS:          Cultures;   CAPILLARY BLOOD GLUCOSE        Lipid panel:           RADIOLOGY & ADDITIONAL TESTS:    Imaging Personally Reviewed:  [x ] YES      Consultant(s) Notes Reviewed:  [x ] YES     Care Discussed with [x ] Consultants [X ] Patient [ ] Family  [x ]    [x ]  Other; RN

## 2017-12-26 NOTE — PROGRESS NOTE ADULT - PROBLEM SELECTOR PLAN 1
tele  tte  ecotrin  diurese w/iv lasix
-bp low normal  -d/c all meds, monitor vitals
Diastolic chf   tele  tte reviewed  ecotrin  diurese w/iv lasix

## 2017-12-27 VITALS
DIASTOLIC BLOOD PRESSURE: 62 MMHG | SYSTOLIC BLOOD PRESSURE: 104 MMHG | OXYGEN SATURATION: 99 % | RESPIRATION RATE: 17 BRPM | TEMPERATURE: 97 F | HEART RATE: 69 BPM

## 2017-12-27 LAB
ANION GAP SERPL CALC-SCNC: 10 MMOL/L — SIGNIFICANT CHANGE UP (ref 5–17)
BUN SERPL-MCNC: 35 MG/DL — HIGH (ref 7–23)
CALCIUM SERPL-MCNC: 7.7 MG/DL — LOW (ref 8.5–10.1)
CHLORIDE SERPL-SCNC: 101 MMOL/L — SIGNIFICANT CHANGE UP (ref 96–108)
CO2 SERPL-SCNC: 27 MMOL/L — SIGNIFICANT CHANGE UP (ref 22–31)
CREAT SERPL-MCNC: 1.31 MG/DL — HIGH (ref 0.5–1.3)
GLUCOSE SERPL-MCNC: 82 MG/DL — SIGNIFICANT CHANGE UP (ref 70–99)
POTASSIUM SERPL-MCNC: 3.4 MMOL/L — LOW (ref 3.5–5.3)
POTASSIUM SERPL-SCNC: 3.4 MMOL/L — LOW (ref 3.5–5.3)
SODIUM SERPL-SCNC: 138 MMOL/L — SIGNIFICANT CHANGE UP (ref 135–145)

## 2017-12-27 PROCEDURE — 99239 HOSP IP/OBS DSCHRG MGMT >30: CPT

## 2017-12-27 RX ADMIN — ENOXAPARIN SODIUM 40 MILLIGRAM(S): 100 INJECTION SUBCUTANEOUS at 06:43

## 2017-12-27 RX ADMIN — Medication 81 MILLIGRAM(S): at 11:33

## 2017-12-27 RX ADMIN — Medication 100 MICROGRAM(S): at 06:43

## 2017-12-27 RX ADMIN — Medication 5 MILLIGRAM(S): at 06:43

## 2017-12-29 DIAGNOSIS — E03.9 HYPOTHYROIDISM, UNSPECIFIED: ICD-10-CM

## 2017-12-29 DIAGNOSIS — I50.31 ACUTE DIASTOLIC (CONGESTIVE) HEART FAILURE: ICD-10-CM

## 2017-12-29 DIAGNOSIS — M19.90 UNSPECIFIED OSTEOARTHRITIS, UNSPECIFIED SITE: ICD-10-CM

## 2017-12-29 DIAGNOSIS — Z79.52 LONG TERM (CURRENT) USE OF SYSTEMIC STEROIDS: ICD-10-CM

## 2017-12-29 DIAGNOSIS — I25.2 OLD MYOCARDIAL INFARCTION: ICD-10-CM

## 2017-12-29 DIAGNOSIS — Z88.0 ALLERGY STATUS TO PENICILLIN: ICD-10-CM

## 2017-12-29 DIAGNOSIS — R06.02 SHORTNESS OF BREATH: ICD-10-CM

## 2017-12-29 DIAGNOSIS — E43 UNSPECIFIED SEVERE PROTEIN-CALORIE MALNUTRITION: ICD-10-CM

## 2017-12-29 DIAGNOSIS — E87.6 HYPOKALEMIA: ICD-10-CM

## 2017-12-29 DIAGNOSIS — E78.5 HYPERLIPIDEMIA, UNSPECIFIED: ICD-10-CM

## 2017-12-29 DIAGNOSIS — Z95.5 PRESENCE OF CORONARY ANGIOPLASTY IMPLANT AND GRAFT: ICD-10-CM

## 2017-12-29 DIAGNOSIS — Z91.81 HISTORY OF FALLING: ICD-10-CM

## 2017-12-29 DIAGNOSIS — F01.51 VASCULAR DEMENTIA, UNSPECIFIED SEVERITY, WITH BEHAVIORAL DISTURBANCE: ICD-10-CM

## 2017-12-29 DIAGNOSIS — M81.0 AGE-RELATED OSTEOPOROSIS WITHOUT CURRENT PATHOLOGICAL FRACTURE: ICD-10-CM

## 2017-12-29 DIAGNOSIS — I16.0 HYPERTENSIVE URGENCY: ICD-10-CM

## 2017-12-29 DIAGNOSIS — I11.0 HYPERTENSIVE HEART DISEASE WITH HEART FAILURE: ICD-10-CM

## 2017-12-29 DIAGNOSIS — I25.10 ATHEROSCLEROTIC HEART DISEASE OF NATIVE CORONARY ARTERY WITHOUT ANGINA PECTORIS: ICD-10-CM

## 2017-12-29 NOTE — CHART NOTE - NSCHARTNOTEFT_GEN_A_CORE
got phone call from dtr Nina Alanis [phone] regarding discharge plan and CXR result which showed Slightly increased findings at the lung bases. 15 mm nodule again suggested superimposing with right heart border. Consider CT chest. The lung fields and pleural surfaces reveal minimal blunting at the left lateral costophrenic angle that appears new since November 9. I spoke to radiologist Dr. Delgado who suggest a follow up nonemergent CT chest. I explained the daughter above. I gave Slade Julio [phone] to follow up. She said she will follow up with family MD. Dr. Marroquin contacted this MD updating me that Pt's dtr Nina Alanis [phone] called and was notably upset over the phone regarding discharge plan and CXR result. I reviewed records and noted that Pt was under hospitalist Dr. Vazquez and I was not involved with the case but the case was assigned to me on the day of discharge. Discussed with Dr. Bonner, Chief hospitalist who recommended that I called family to address their concern. Called the dtr, she was angry that the CXR findings was not reported to the family. This MD explained to her regarding CXR findings. I spoke to radiologist Dr. Delgado who suggest a follow up nonemergent CT chest. I explained the daughter above. I gave Slade Julio [phone] to follow up. She said she will follow up with family MD. I listened to her concern and also attempted to answer questions.

## 2018-01-16 ENCOUNTER — APPOINTMENT (OUTPATIENT)
Dept: CT IMAGING | Facility: HOSPITAL | Age: 83
End: 2018-01-16

## 2018-01-16 ENCOUNTER — OUTPATIENT (OUTPATIENT)
Dept: OUTPATIENT SERVICES | Facility: HOSPITAL | Age: 83
LOS: 1 days | Discharge: HOME | End: 2018-01-16
Payer: MEDICARE

## 2018-01-16 DIAGNOSIS — Z90.710 ACQUIRED ABSENCE OF BOTH CERVIX AND UTERUS: Chronic | ICD-10-CM

## 2018-01-16 DIAGNOSIS — C34.90 MALIGNANT NEOPLASM OF UNSPECIFIED PART OF UNSPECIFIED BRONCHUS OR LUNG: ICD-10-CM

## 2018-01-16 PROCEDURE — 71250 CT THORAX DX C-: CPT | Mod: 26

## 2018-02-07 ENCOUNTER — APPOINTMENT (OUTPATIENT)
Dept: CT IMAGING | Facility: HOSPITAL | Age: 83
End: 2018-02-07

## 2018-02-07 ENCOUNTER — OUTPATIENT (OUTPATIENT)
Dept: OUTPATIENT SERVICES | Facility: HOSPITAL | Age: 83
LOS: 1 days | Discharge: HOME | End: 2018-02-07
Payer: MEDICARE

## 2018-02-07 DIAGNOSIS — Z90.710 ACQUIRED ABSENCE OF BOTH CERVIX AND UTERUS: Chronic | ICD-10-CM

## 2018-02-07 DIAGNOSIS — R91.1 SOLITARY PULMONARY NODULE: ICD-10-CM

## 2018-02-07 PROCEDURE — 71260 CT THORAX DX C+: CPT | Mod: 26

## 2018-03-29 NOTE — ED ADULT NURSE NOTE - CAS EDN DISCHARGE ASSESSMENT
Please inform patient antiphospholipid antibody panel negative. 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 This is of unknown clinical significance. Continue hydroxychloroquine.   Awake/Patient baseline mental status

## 2020-02-13 NOTE — PATIENT PROFILE ADULT. - PURPOSEFUL PROACTIVE ROUNDING
Writer left patient a detailed message stating that her appointment needs to be moved tomorrow, offered patient Wednesday the 19th at 1130 and advised patient to call back to reschedule appointment.   
Patient

## 2020-07-31 PROBLEM — I10 ESSENTIAL (PRIMARY) HYPERTENSION: Chronic | Status: ACTIVE | Noted: 2017-12-22

## 2020-07-31 PROBLEM — M19.90 UNSPECIFIED OSTEOARTHRITIS, UNSPECIFIED SITE: Chronic | Status: ACTIVE | Noted: 2017-12-22

## 2020-07-31 PROBLEM — F01.50 VASCULAR DEMENTIA WITHOUT BEHAVIORAL DISTURBANCE: Chronic | Status: ACTIVE | Noted: 2017-12-22

## 2020-07-31 PROBLEM — E03.9 HYPOTHYROIDISM, UNSPECIFIED: Chronic | Status: ACTIVE | Noted: 2017-12-22

## 2020-07-31 PROBLEM — E78.2 MIXED HYPERLIPIDEMIA: Chronic | Status: ACTIVE | Noted: 2017-12-22

## 2020-08-06 ENCOUNTER — APPOINTMENT (OUTPATIENT)
Dept: VASCULAR SURGERY | Facility: CLINIC | Age: 85
End: 2020-08-06
Payer: MEDICARE

## 2020-08-06 VITALS — SYSTOLIC BLOOD PRESSURE: 158 MMHG | DIASTOLIC BLOOD PRESSURE: 97 MMHG | HEART RATE: 72 BPM

## 2020-08-06 VITALS — TEMPERATURE: 98.4 F | HEIGHT: 64 IN | BODY MASS INDEX: 21.17 KG/M2 | WEIGHT: 124 LBS

## 2020-08-06 VITALS — DIASTOLIC BLOOD PRESSURE: 100 MMHG | HEART RATE: 73 BPM | SYSTOLIC BLOOD PRESSURE: 187 MMHG

## 2020-08-06 DIAGNOSIS — L89.892 PRESSURE ULCER OF OTHER SITE, STAGE 2: ICD-10-CM

## 2020-08-06 PROCEDURE — 99203 OFFICE O/P NEW LOW 30 MIN: CPT

## 2020-08-07 PROBLEM — L89.892: Status: ACTIVE | Noted: 2020-08-07

## 2020-10-13 NOTE — ED ADULT TRIAGE NOTE - PAIN RATING/NUMBER SCALE (0-10): ACTIVITY
Nursing Assessment Note  C1 D1 Cisplatin with radiation      Date:  Sep 21, 2020    Name:  Olamide Mari   :  1984    Diagnosis: Primary C53.9 - Malignant neoplasm of cervix uteri, unspecified,  Diagnosed Aug 05, 2020  (Active)    Treatment Regimen:     Cisplatin-40 + RT    Last Treatment Date:  No past chemo visits    Toxicities:  There are no reported toxicities.    Vital Signs:  Performed on Sep 21, 2020 11:37  Height - 163.00 cms   Pulse - 78 /min   BP - 131/96 mm(hg)   Performed on Sep 21, 2020 09:11  Height - 163.00 cms   Weight - 98.70 kg (LOW)   BSA - 2.03 sq.m   BMI - 37.1500 (HIGH)   Temperature - 96.9 F   Pulse - 111 /min (HIGH)   Respiration - 16 /min   BP - 138/99 mm(hg)   Pain - 0      Allergies:  No Known Allergies.  Allergies were reviewed.  No new allergies.    Medications:   The medication list was reviewed. No changes noted.    Psychosocial assessment:    Choose one:    No new psychosocial concerns      ECOG performance status:     1 ambulatory/able to carry out light work; but no strenuous activity      Labs reviewed with the patient:  yes   x   no   If no, explain:      Contraceptive Review:  Patient had hysterectomy        Note:     S/O:  Pt. here with   for C1 D1 chemotherapy with radiation, continues to c/o R sided flank pain, taking 2 tabs Norco 10/325 mg every 4 hours with only minor relief, reports voiding freely and frequently, denies blood in urine and/or pain,  stents inserted approximately 3 weeks ago, finished antibiotic for abscess approximately 10 days ago, no  fever noted.  Pt. anxious but ready to start treatment.  Labs from  reviewed, creatinine WNL, WBC slightly elevated.      A:  Reviewed above complaints and labs with Dr. Fletcher, orders received to proceed with treatment as ordered. RN chairside for approximately 45 minutes for educational purposes, discussed possible side effects of treatment with patient and spouse, written information provided,  see questionnaires for additional detail.   Reviewed treatment plan, discharge instructions  and upcoming schedule of appointments, calendar and copy of discharge instructions provided.  C1 D1 Cisplatin administered without incident.  Per Dr. Fletcher, additional Rx for Norco provided, pt. to follow up with Dr. Cabral regarding pain management, number given.     P;  RTC in one week for D8 Cisplatin, discharged to obtain 1st radiation today.Questions were answered and understanding was verbalized.      Electronically signed by,    Svetlana Shelton        0

## 2023-05-25 NOTE — ED ADULT NURSE NOTE - DOES PATIENT HAVE ADVANCE DIRECTIVE
Pt and father updated on plan of care. Pt smiling and acting age appropriate with staff at this time. Watching television and on phone. Father remains at bedside. No questions or concerns at this time. Lights dimmed for non pharmacological pain management. Call light in reach. Will continue to monitor.       Xavi Hendrickson RN  05/24/23 [id number] No

## 2025-01-02 NOTE — ED ADULT TRIAGE NOTE - CCCP TRG CHIEF CMPLNT
Continuous Glucose Sensor (DEXCOM G7 SENSOR) MISC   9 each 3 7/1/2024       Last Office Visit : 4-  Future Office visit:  2-    
urinary/bladder trouble
